# Patient Record
Sex: MALE | Race: BLACK OR AFRICAN AMERICAN | NOT HISPANIC OR LATINO | Employment: UNEMPLOYED | URBAN - METROPOLITAN AREA
[De-identification: names, ages, dates, MRNs, and addresses within clinical notes are randomized per-mention and may not be internally consistent; named-entity substitution may affect disease eponyms.]

---

## 2023-05-28 ENCOUNTER — HOSPITAL ENCOUNTER (EMERGENCY)
Facility: OTHER | Age: 23
Discharge: HOME | End: 2023-05-28
Attending: EMERGENCY MEDICINE
Payer: MEDICAID

## 2023-05-28 VITALS
DIASTOLIC BLOOD PRESSURE: 63 MMHG | HEART RATE: 78 BPM | OXYGEN SATURATION: 99 % | TEMPERATURE: 98 F | SYSTOLIC BLOOD PRESSURE: 102 MMHG | RESPIRATION RATE: 14 BRPM

## 2023-05-28 DIAGNOSIS — F10.920 ALCOHOLIC INTOXICATION WITHOUT COMPLICATION: Primary | ICD-10-CM

## 2023-05-28 DIAGNOSIS — T40.601A OPIATE OVERDOSE, ACCIDENTAL OR UNINTENTIONAL, INITIAL ENCOUNTER: ICD-10-CM

## 2023-05-28 DIAGNOSIS — T50.901A OVERDOSE: ICD-10-CM

## 2023-05-28 LAB
HCV AB SERPL QL IA: NEGATIVE
HIV 1+2 AB+HIV1 P24 AG SERPL QL IA: NEGATIVE
POCT GLUCOSE: 98 MG/DL (ref 70–110)

## 2023-05-28 PROCEDURE — 96374 THER/PROPH/DIAG INJ IV PUSH: CPT

## 2023-05-28 PROCEDURE — 87389 HIV-1 AG W/HIV-1&-2 AB AG IA: CPT | Performed by: EMERGENCY MEDICINE

## 2023-05-28 PROCEDURE — 82962 GLUCOSE BLOOD TEST: CPT

## 2023-05-28 PROCEDURE — 63600175 PHARM REV CODE 636 W HCPCS: Performed by: EMERGENCY MEDICINE

## 2023-05-28 PROCEDURE — 93010 ELECTROCARDIOGRAM REPORT: CPT | Mod: ,,, | Performed by: INTERNAL MEDICINE

## 2023-05-28 PROCEDURE — 93010 EKG 12-LEAD: ICD-10-PCS | Mod: ,,, | Performed by: INTERNAL MEDICINE

## 2023-05-28 PROCEDURE — 86803 HEPATITIS C AB TEST: CPT | Performed by: EMERGENCY MEDICINE

## 2023-05-28 PROCEDURE — 99284 EMERGENCY DEPT VISIT MOD MDM: CPT | Mod: 25

## 2023-05-28 PROCEDURE — 93005 ELECTROCARDIOGRAM TRACING: CPT

## 2023-05-28 RX ORDER — NALOXONE HYDROCHLORIDE 4 MG/.1ML
SPRAY NASAL
Qty: 1 EACH | Refills: 11 | Status: SHIPPED | OUTPATIENT
Start: 2023-05-28

## 2023-05-28 RX ORDER — ONDANSETRON 2 MG/ML
4 INJECTION INTRAMUSCULAR; INTRAVENOUS
Status: COMPLETED | OUTPATIENT
Start: 2023-05-28 | End: 2023-05-28

## 2023-05-28 RX ORDER — NALOXONE HYDROCHLORIDE 4 MG/.1ML
SPRAY NASAL
Qty: 1 EACH | Refills: 0 | Status: SHIPPED | OUTPATIENT
Start: 2023-05-28

## 2023-05-28 RX ADMIN — ONDANSETRON 4 MG: 2 INJECTION INTRAMUSCULAR; INTRAVENOUS at 04:05

## 2023-05-28 NOTE — ED TRIAGE NOTES
Pt reports to ED with accidental overdose and alcohol intoxication. Pt denies any PMH. Respirations even and unlabored. Aaox4 at this time patient does not appear clinically intoxicated.

## 2024-06-04 NOTE — ED PROVIDER NOTES
Assessment & Plan   Problem List Items Addressed This Visit          Nervous and Auditory    Chronic pain syndrome    Type 1 diabetes mellitus with diabetic autonomic neuropathy (H)    Relevant Medications    methocarbamol (ROBAXIN) 500 MG tablet       Digestive    Gastroparesis    Exocrine pancreatic insufficiency    Gastroesophageal reflux disease without esophagitis       Endocrine    Hypothyroidism    Hyperlipidemia       Circulatory    Essential hypertension       Behavioral    Mild major depression (H)       Other    Cervical stenosis of spine    Relevant Medications    methocarbamol (ROBAXIN) 500 MG tablet    Insulin pump status    Chronic, continuous use of opioids     Other Visit Diagnoses       Radicular pain in right arm    -  Primary    Relevant Medications    methocarbamol (ROBAXIN) 500 MG tablet    Other Relevant Orders    CT Cervical Spine w/o Contrast           Worsening right radicular pain with no focal deficits or red flag symptoms.  Plan for repeat CT now given his inability to do MRI with his stimulator in place.  We did discuss home strengthening as well as range of motion but may need follow-up with neurosurgery pending findings.  Plan to transition to Robaxin as Flexeril makes him very sleepy but is helpful for his pain.  Discussion regarding his chronic opioid use today.  Okay to increase to 3 tablets daily and have early refill given acute radicular symptoms but will plan to transition back to previous quantity when symptoms calming down.  We did discuss prednisone but given his diabetes we will avoid this now.  Follow-up with imaging      Nabeel Ritter is a 55 year old, presenting for the following health issues:  Back Pain and Neck Pain    History of Present Illness       Back Pain:  He presents for follow up of back pain. Patient's back pain is a recurring problem.  Location of back pain:  Right lower back, left lower back, right side of neck and right shoulder  Description of  "Encounter Date: 5/28/2023       History     Chief Complaint   Patient presents with    overdose     Pt unresponsive with friends on [facility].EMS reports decreased O2, pinpoint pupils and vomiting. Pt received 0.4mg Narcan with +response.  +ETOH     22-year-old male with no significant past medical history brought in by EMS after reportedly being [facility] un[facility] with his friends.  Per EMS, the patient had pinpoint pupils and decreased oxygen saturation and had a positive response to 0.4 mg of Narcan.  The patient does admit to drinking alcohol but denies any drug use.  He states he feels "fucked up" right now but denies any specific complaints.      Review of patient's allergies indicates:  No Known Allergies  Past Medical History:   Diagnosis Date    ADHD (attention deficit hyperactivity disorder)      History reviewed. No pertinent surgical history.  History reviewed. No pertinent family history.  Social History     Tobacco Use    Smoking status: Never   Substance Use Topics    Alcohol use: No    Drug use: No     Review of Systems    Physical Exam     Initial Vitals [05/28/23 0354]   BP Pulse Resp Temp SpO2   100/60 90 14 97.9 °F (36.6 °C) 100 %      MAP       --         Physical Exam    Constitutional: He appears well-developed and well-nourished. He is not diaphoretic. No distress.   HENT:   Head: Atraumatic.   Right Ear: External ear normal.   Left Ear: External ear normal.   Eyes: Conjunctivae and EOM are normal. Pupils are equal, round, and reactive to light. Right eye exhibits no discharge. Left eye exhibits no discharge.   Neck: Neck supple.   Cardiovascular:  Normal rate, regular rhythm and normal heart sounds.     Exam reveals no gallop.       No murmur heard.  Pulmonary/Chest: Breath sounds normal. No respiratory distress. He has no wheezes. He has no rhonchi. He has no rales.   Abdominal: Abdomen is soft. He exhibits no distension. There is no abdominal tenderness. There is " "back pain: burning and cramping  Back pain spreads: right buttocks, left buttocks, right thigh, left thigh, right knee, left knee, right foot, left foot, right shoulder and right side of neck    Since patient first noticed back pain, pain is: gradually worsening  Does back pain interfere with his job:  Not applicable       He eats 0-1 servings of fruits and vegetables daily.He consumes 0 sweetened beverage(s) daily.He exercises with enough effort to increase his heart rate 10 to 19 minutes per day.  He exercises with enough effort to increase his heart rate 3 or less days per week. He is missing 7 dose(s) of medications per week.     Patient with tingling at times into the right extremity but no weakness noted on exam.  He is able to urinate and stooling normal for him.      Review of Systems  Constitutional, HEENT, cardiovascular, pulmonary, GI, , musculoskeletal, neuro, skin, endocrine and psych systems are negative, except as otherwise noted.      Objective    /66   Pulse 84   Temp 98.1  F (36.7  C) (Temporal)   Resp 16   Ht 1.721 m (5' 7.75\")   Wt 66.7 kg (147 lb)   SpO2 97%   BMI 22.52 kg/m    Body mass index is 22.52 kg/m .  Physical Exam   GENERAL: alert and no distress  EYES: Eyes grossly normal to inspection, PERRL and conjunctivae and sclerae normal  HENT: nose and mouth without ulcers or lesions  NECK: no adenopathy, no asymmetry, masses, or scars  RESP: lungs clear to auscultation - no rales, rhonchi or wheezes  CV: regular rate and rhythm, normal S1 S2, no murmur, click or rub, no peripheral edema  MS: no gross musculoskeletal defects noted, no edema, upper back and neck tenderness and paraspinal and trap areas.  No anterior shoulder tenderness and range of motion passively intact, negative Spurling's.  Lateral epicondyle tenderness, Arriaga and empty can negative  SKIN: no suspicious lesions or rashes  NEURO: Normal strength and tone, sensation of upper extremities intact  Symmetric, " no rebound and no guarding.   Musculoskeletal:         General: Normal range of motion.      Cervical back: Neck supple.     Neurological: He is alert. GCS score is 15. GCS eye subscore is 4. GCS verbal subscore is 5. GCS motor subscore is 6.   Oriented to person time       ED Course   Procedures  Labs Reviewed   HIV 1 / 2 ANTIBODY    Narrative:     Release to patient->Immediate   HEPATITIS C ANTIBODY    Narrative:     Release to patient->Immediate   POCT GLUCOSE     EKG Readings: (Independently Interpreted)   Initial Reading: No STEMI. Rhythm: Normal Sinus Rhythm. Heart Rate: 84.     Imaging Results    None          Medications   ondansetron injection 4 mg (4 mg Intravenous Given 5/28/23 0426)        Additional MDM:   Comments: 22-year-old male brought in by EMS with suspected opiate overdose.  Patient denies any drug use but does admit to drinking alcohol.  Vital signs within normal limits.  Will observe on a cardiac monitor, obtain an EKG and blood glucose and give IV fluids.    6:01 AM  HD stable without events on the cardiac monitor.  O2 sat on RA %.  BG WNL.  Will continue monitor until clinically sober.  Pt's care will be transferred to the 6am physician at this time [facility].        .         ED Course as of 05/28/23 2104   Sun May 28, 2023   0607 Patient signed out to me for intoxication with accidental overdose.  I doubt intentional overdose.  I do not think PEC is indicated.  Patient has had no recurrent hypoxia or need for repeat dosing of naloxone.  Likely concomitant alcohol intoxication.  I will observe to clinical sobriety. [MR]   0714 Patient reassessed and alert, appropriate, no current complaints.  He is clinically sober at this time.  The no sign of other acute medical issue requiring emergency department treatment or evaluation.  On my examination GCS is 15 with cranial nerves 3-12 intact.  Mental status is normal.  Normal gait.  5/5 strength and equal sensation to light touch bilaterally.   Patient is cautioned regarding substance abuse and overdose potential.  Naloxone as needed prescribed.  Follow up with PCP.  Return precautions reviewed. [MR]      ED Course User Index  [MR] Sanford Isbell MD                 Clinical Impression:   Final diagnoses:  [T50.901A] Overdose  [F10.920] Alcoholic intoxication without complication (Primary)  [T40.601A] Opiate overdose, accidental or unintentional, initial encounter        ED Disposition Condition    Discharge Stable          ED Prescriptions       Medication Sig Dispense Start Date End Date Auth. Provider    naloxone (NARCAN) 4 mg/actuation Spry 4mg by nasal route as needed for opioid overdose; may repeat every 2-3 minutes in alternating nostrils until medical help arrives. Call 911 1 each 5/28/2023 -- Violetta Castro MD    naloxone (NARCAN) 4 mg/actuation Spry 4mg by nasal route as needed for opioid overdose; may repeat every 2-3 minutes in alternating nostrils until medical help arrives. Call 911 1 each 5/28/2023 -- Sanford Isbell MD          Follow-up Information       Follow up With Specialties Details Why Contact Info    [facility] - Emergency Dept Emergency Medicine Go to  If symptoms worsen [address] [facility] [id number] [phone]    [facility] Internal Medicine, Family Medicine  or PCP of your choice, 1 week [address] [facility] [address]  [phone]               Violetta Castro MD  05/28/23 0603       Violetta Castro MD  05/28/23 7078     mentation intact and speech normal  PSYCH: mentation appears normal, affect normal/bright          Signed Electronically by: Mushtaq Haq MD